# Patient Record
Sex: MALE
[De-identification: names, ages, dates, MRNs, and addresses within clinical notes are randomized per-mention and may not be internally consistent; named-entity substitution may affect disease eponyms.]

---

## 2019-12-19 ENCOUNTER — HOSPITAL ENCOUNTER (OUTPATIENT)
Dept: HOSPITAL 92 - ULT | Age: 69
Discharge: HOME | End: 2019-12-19
Attending: STUDENT IN AN ORGANIZED HEALTH CARE EDUCATION/TRAINING PROGRAM
Payer: MEDICARE

## 2019-12-19 DIAGNOSIS — N44.2: ICD-10-CM

## 2019-12-19 DIAGNOSIS — N43.3: Primary | ICD-10-CM

## 2019-12-19 DIAGNOSIS — K40.90: ICD-10-CM

## 2019-12-19 DIAGNOSIS — N50.3: ICD-10-CM

## 2019-12-19 PROCEDURE — 93976 VASCULAR STUDY: CPT

## 2019-12-19 PROCEDURE — 76870 US EXAM SCROTUM: CPT

## 2019-12-19 NOTE — ULT
SCROTAL ULTRASOUND



INDICATION: Right-sided swelling



TECHNIQUE: Grayscale, color Doppler spectral Doppler images were obtained of the scrotum.



COMPARISON: None.



FINDINGS:



Right Testicle:

 

Size:  3.6 x 3.2 x 2.1cm. There is herniated bowel seen within the right hemiscrotum.

Flow: There is normal vascular flow to the right testicle 

Hydrocele: No right-sided hydrocele is evident

Epididymis:  There is a tiny right epididymal head cyst measuring 4 mm.



Left Testicle: 



Size:  4.4 x 3.1 x 2.2 cm. There is a tiny 4 mm cyst seen within the posterior and inferior left test
icle likely reflective of a benign intratesticular cyst. This is seen near the rete testis.

Flow: There is normal vascular flow to left testicle.  

Hydrocele: Small left-sided hydrocele.

Epididymis:  The left epididymis appears within normal limits.





Additional findings: None.



Impression:

1. Findings most consistent with a right-sided inguinal hernia containing bowel. This is protruding i
nto the right hemiscrotum. CT evaluation of the abdomen and pelvis is recommended for additional

characterization.

2. Small left-sided intratesticular cyst.

3. Small right epididymal head cyst.



Reported By: Paco Knox 

Electronically Signed:  12/19/2019 3:41 PM

## 2020-02-25 NOTE — HP
HISTORY OF PRESENT ILLNESS:  Danilo Santoro is a 69-year-old black male patient,

retired teacher, smokes a pack a day.  Has a large right inguinal hernia into

scrotum.  Plan is robotic mesh repair outpatient.  He does have some decreased force

of stream, but no nocturia.  He has a diagnosis of BPH, but is not on Flomax. 



SOCIAL HISTORY:  Tobacco, one pack per day.  Alcohol occasional, 1-3 drinks of

Meir's. 



ALLERGIES:  NONE.



PAST SURGICAL HISTORY:  Foot surgery.



PAST MEDICAL HISTORY:  Hypertension, BPH.



MEDICATIONS:  

1. Metoprolol 50 mg extended release daily.

2. Losartan potassium 50 mg daily.



REVIEW OF SYSTEMS:  Ten-point noncontributory.



FAMILY HISTORY:  Colon cancer.  Never has had a colonoscopy, I encouraged him to

seek that. 



PHYSICAL EXAMINATION:

VITAL SIGNS:  Weight 206 pounds, height 6 feet 1 inches, blood pressure 153/95,

pulse 91, temperature 97.3 degrees. 

HEAD, EARS, EYES, NOSE AND THROAT:  Unremarkable. 

LUNGS:  Clear to auscultation. 

CARDIAC:  Regular rate and rhythm without murmur or gallop. 

ABDOMEN:  Soft and nontender. 

EXTREMITIES:  Without ankle edema. 

NEUROLOGICAL:  Intact. 

LYMPHATICS:  No lymphadenopathy in neck, axilla, or groins.  Left groin without

hernia.  Testicles normal.  Right inguinal hernia on standing into his scrotum. 



ASSESSMENT AND PLAN:  Right inguinal hernia into scrotum sliding.  We will plan

laparoscopic mesh repair as outpatient.  He understands risks and benefits,

consents. 







Job ID:  [id number]

## 2020-02-25 NOTE — EKG
Test Reason : 

Blood Pressure : ***/*** mmHG

Vent. Rate : 057 BPM     Atrial Rate : 057 BPM

   P-R Int : 134 ms          QRS Dur : 080 ms

    QT Int : 396 ms       P-R-T Axes : 055 049 063 degrees

   QTc Int : 385 ms

 

Sinus bradycardia with marked sinus arrhythmia

Otherwise normal ECG

No previous ECGs available

Confirmed by DR. LAKEISHA LEON (3) on 2/25/2020 5:07:46 PM

 

Referred By:  JOEY           Confirmed By:DR. LAKEISHA LEON

## 2020-02-26 NOTE — OP
DATE OF PROCEDURE:  02/26/2020



PREOPERATIVE DIAGNOSIS:  Sliding right inguinal hernia.



POSTOPERATIVE DIAGNOSIS:  Sliding right inguinal hernia.



PROCEDURE PERFORMED:  Robot/laparoscopic 3D Bard mesh large right repair of right

inguinal hernia. 



ANESTHESIA:  General, local with 0.5% Marcaine with epinephrine 30 mL mixed with 1%

Xylocaine with epinephrine 20 mL. 



DESCRIPTION OF PROCEDURE:  The patient was taken to the operating room where under

general anesthesia Altamirano catheter was placed at the beginning of the procedure and

removed at the end.  Abdomen was prepared with ChloraPrep and draped in routine

fashion.  Local anesthetic was infiltrated in the skin and subcutaneous tissue about

each port site.  Supraumbilical incision was made left to midline and

pneumoperitoneum to 15 mmHg was obtained with a Veress needle, replacing with a 12

balloon port.  Bilateral anterior axial line incision was made at the same level

above the umbilical level and an 8-mm port was placed under laparoscopic

visualization.  Left groin without hernia.  There was a large right inguinal hernia.

 Peritoneal flap dissected free from medial to lateral to the anterior superior

iliac spine and peritoneal flap dissected free to the retroperitoneum.  Cord

structures dissected from the hernia sac, identifying Serafin's ligament.  Once the

hernia sac was dissected free, continued into the scrotum and was transected and

reduced and the retroperitoneum dissected free and the 3D Bard Max mesh large right

placed in anatomically good position and held in place, secured the Serafin's

ligament with 2-0 Vicryl to the anterior abdominal wall to the patient's right of

the inferior epigastric with 2-0 Vicryl and then the peritoneal defect closed with

continuous suture of 3-0 Stratafix.  There was a peritoneal hernia defect. 

Peritoneal defect closed with 2-0 Vicryl suture.  Once this was completed,

pneumoperitoneum reduced.  All instruments were removed and anterior rectus fascia

in the supraumbilical incision closed with 0 Vicryl UR needle and skin incision was

closed with continuous subcuticular suture of 4-0 Monocryl and Derma glue applied.

The patient tolerated the procedure well. 





Job ID:  [id number]

## 2020-12-01 NOTE — CT
CT ABDOMEN AND PELVIS WITH IV CONTRAST

 12/1/2020



CLINICAL INFORMATION:

Midline abdominal pain.



COMPARISON:

 None.



Technique:

Multiple contiguous axial CT images are obtained through the abdomen and pelvis with IV contrast. Cor
onal reformatted images are provided.



FINDINGS:



Lower Chest: Calcified granulomata are seen in the lingula. There are linear bibasilar densities with
 slightly greater groundglass density and increased interstitial opacities in the inferior aspect

left lower lobe and involving the lingula which may be related to chronic interstitial lung changes.

Vessels: Vascular calcifications are seen in the abdominal aorta and involving the iliac arteries. 



Abdomen:

Portal vein:Patent

Gallbladder: Within normal limits for CT imaging.

Liver: Multiple calcified granulomata.

Spleen: Multiple calcified granulomata.

Pancreas: within normal limits.

Adrenals: within normal limits.

Kidneys: There is artifact seen extending through the kidneys bilaterally. There is a low-attenuation
 cystic structure within the midportion right kidney measuring 2.7 cm. This likely represents a

parapelvic renal cyst. Fluid attenuation is present in this cystic structure, but there is artifact e
xtending through this region which limits adequate evaluation. Follow-up renal sonogram is

recommended. Kidneys otherwise have a normal CT appearance.



Bowel: Evidence of colonic diverticulosis with innumerable colonic diverticuli seen involving the yohan
cending and sigmoid colon. Loops of small bowel are normal in caliber.

Appendix: The appendix is visualized and normal in caliber.





Peritoneum: No ascites or free air; no fluid collection.

Mesentery and Retroperitoneum: No enlarged mesenteric or retroperitoneal lymph nodes.

Abdominal Wall: Low-attenuation area is seen within the right inguinal canal and the right anterolate
ral aspect of the pelvis adjacent to the inguinal canal. This may potentially be related to prior

hernia repair in this region. Clinical correlation is recommended.



Pelvis:

Reproductive Organs: No pelvic masses.

Bladder: within normal limits.



Bones: Severe bilateral hip osteoarthritis is present. Degenerative changes seen in the spine with sa
croiliac joint osteoarthritis seen. There is a well-defined lucency with the sclerotic margins seen

in the intertrochanteric region left hip measuring 1.6 cm which has the appearance of a lesion of low
 biological activity.  



IMPRESSION:



1. Low-attenuation cystic structure right kidney which does not demonstrate fluid attenuation to defi
nitively suggest a simple parapelvic cyst. However, there is artifact extending through this region

limiting evaluation. Follow-up nonemergent renal ultrasound is recommended.

2. Colonic diverticulosis.

3. Findings likely attributable to prior right inguinal hernia repair. Clinical correlation is recomm
ended.

4. Severe bilateral hip osteoarthritis.

5. Findings likely attributable to chronic bibasilar lung changes. Superimposed more acute interstiti
al process would be difficult to entirely exclude.

6. No acute findings in the abdomen or pelvis.



Reported By: Ernst Henson 

Electronically Signed:  12/1/2020 3:53 PM

## 2020-12-01 NOTE — PDOC.FPRHP
- History of Present Illness


Chief Complaint: fatigue


History of Present Illness: 





Pt is a 71 yo M with a PMH of HTN, COPD, diverticulosis, and bilateral hip OA 

who presents via EMS for chief complaint of fatigue. Patient states he was 

feeling "fine", maybe a little bit tired, but his wife noticed that over the 

last 4 days patient seemed more fatigued and did not get up from his chair. 

Patient states he still has a normal appetite, but he does not eat much at his 

baseline. Patient only endorses a new cough that started over the last 5 days or

so with some occasional sputum production. He does not require O2 at baseline 

and this cough is new for him. He states his PCP back in [place] prescribed him a

daily inhaler (unknown name) but he has not taken it. Patient denies dysuria, 

fever, chills, N/V, changes in urine, abdominal pain, weakness, CP, SOB.  


ED Course: 





Azitrhomycin, Rocephin, Vancomycin, 2.5L fluids





- Allergies/Adverse Reactions


                                    Allergies











Allergy/AdvReac Type Severity Reaction Status Date / Time


 


No Known Drug Allergies Allergy   Verified 12/01/20 19:35














- Home Medications


                                        











 Medication  Instructions  Recorded  Confirmed  Type


 


Aspirin [Eren Chewable] 81 mg PO DAILY 12/01/20 12/01/20 History


 


Losartan Potassium 50 mg PO DAILY 12/01/20 12/01/20 History














- History


PMHx:


 HTN, diverticulosis, COPD, bilateral hip OA





PSHx: 


foot surgery, hernia repair





FHx:


 HTN





Social:


 2ppd for 20 years, social alcohol use (whiskey and beer on weekends, unable to 

quantify), denies drug use








- Review of Systems


General: reports: fatigue.  denies: fever/chills, weight/appetite/sleep changes


ENT: denies: nasal congestion


Respiratory: reports: cough.  denies: shortness of breath


Cardiovascular: denies: chest pain, edema


Gastrointestinal: denies: nausea, vomiting


Genitourinary: reports: incontinence.  denies: dysuria


Skin: denies: rashes


Musculoskeletal: reports: stiffness


Neurological: denies: syncope





- Vital signs


BP: 125/85  HR: 110 RR: 37 Tmax: 99.9 Pox: 97% on 3L Wt: 96kg  








- Physical Exam


Constitutional: NAD, awake, alert and oriented


HEENT: normocephalic and atraumatic, EOMI, conjunctiva clear, grossly normal 

hearing


Neck: supple, trachea midline


Heart: RRR, normal S1/S2, no murmurs/rubs/gallops, pulses present, no edema


Lungs: no respiratory distress, no wheezing


-Lungs: 





distant breath sounds


Abdomen: soft, non-tender, bowel sounds present, no masses/distention


Musculoskeletal: normal structure, normal tone


Neurological: no focal deficit


Skin: no rash/lesions, good turgor


Psychiatric: normal mood and affect, good judgment and insight, intact recent 

and remote memory





FMR H&P: Results





- Labs


Result Diagrams: 


                                 12/02/20 06:35





                                 12/02/20 06:35


Lab results: 


                                        











WBC  3.0 thou/uL (4.8-10.8)  L  12/01/20  14:07    


 


Hgb  15.9 g/dL (14.0-18.0)   12/01/20  14:07    


 


Hct  47.2 % (42.0-52.0)   12/01/20  14:07    


 


MCV  88.5 fL (78.0-98.0)   12/01/20  14:07    


 


Plt Count  161 thou/uL (130-400)   12/01/20  14:07    


 


Band Neuts % (Manual)  10 % (5-11)   12/01/20  14:07    


 


Sodium  134 mmol/L (136-145)  L  12/01/20  14:07    


 


Potassium  4.4 mmol/L (3.5-5.1)   12/01/20  14:07    


 


Chloride  99 mmol/L ()   12/01/20  14:07    


 


Carbon Dioxide  25 mmol/L (23-31)   12/01/20  14:07    


 


BUN  12 mg/dL (8.4-25.7)   12/01/20  14:07    


 


Creatinine  1.01 mg/dL (0.7-1.3)   12/01/20  14:07    


 


Glucose  105 mg/dL ()   12/01/20  14:07    


 


Lactic Acid  1.0 mmol/L (0.5-2.2)   12/01/20  17:14    


 


Calcium  8.6 mg/dL (7.8-10.44)   12/01/20  14:07    


 


Total Bilirubin  0.6 mg/dL (0.2-1.2)   12/01/20  14:07    


 


AST  31 U/L (5-34)   12/01/20  14:07    


 


ALT  16 U/L (8-55)   12/01/20  14:07    


 


Alkaline Phosphatase  66 U/L ()   12/01/20  14:07    


 


Creatine Kinase  184 U/L ()   12/01/20  14:07    


 


B-Natriuretic Peptide  10.1 pg/mL (0-100)   12/01/20  14:07    


 


Serum Total Protein  7.3 g/dL (5.8-8.1)   12/01/20  14:07    


 


Albumin  3.6 g/dL (3.4-4.8)   12/01/20  14:07    


 


Urine Ketones  Negative mg/dL (Negative)   12/01/20  13:42    


 


Urine Blood  Negative  (Negative)   12/01/20  13:42    


 


Urine Nitrite  2+  (Negative)  A  12/01/20  13:42    


 


Ur Leukocyte Esterase  250 Ghassan/uL (Negative)  A  12/01/20  13:42    


 


Urine RBC  0-3 HPF (0-3)   12/01/20  13:42    


 


Urine WBC  21-50 HPF (0-3)  A  12/01/20  13:42    


 


Ur Squamous Epith Cells  None Seen HPF (0-3)   12/01/20  13:42    


 


Urine Bacteria  4+ HPF (None Seen)  A  12/01/20  13:42    














FMR H&P: A/P





- Plan





Sepsis likely 2/2 UTI vs influenza


-WBC 3.0, tachypneic, tachycardic, febrile as per EMS report


-UA suggestive of UTI, follow up culture


-lactic acid 2.1>1.0


-s/p Azitrho, rocephin, Vanc in ED


-Rocephin while culture and sensitivities are pending


-Flu swab pending, procal pending





Hypoxia likely 2/2 COPD exacerbation vs chronic untreated COPD 


-DuoNebs q46 CASSANDRA, q6PRN


-prednisone 40mg for 5 days





Bilateral hip OA


-aware


-PRN Tylenol





HTN


-aware, continue home meds


-avoid beta blockers





BPH


-aware, continue home meds





Right kidney cyst


-likely simple parapelvic cyst found incidentally on CT abd/pelvis


-recommended non emergent renal U/s, pending. follow up results





Diverticulosis


-found incidentally on Ct Abd/Pelvis


-encourage increased fiber intake





Increased alcohol consumption


-ASE protocol ordered





Fluid: KVO


DVT Ppx: Lovenox


Diet: HH


PCP: [facility] call- none


Dispo: admit to medical, inpatient. Anticipated LOS > 48 hours








FMR H&P: Upper Level





- Plan


Date/Time: 12/01/20 1900








I, Donnie Bueno [id number], have evaluated this patient and agree with findings/plan as

outlined by intern resident. Pertinent changes/additions are listed here.





69yo M presenting with non specific symptoms and cough





Labs show evidence of UTI and pt meeting SIRS criteria on exam by fever with EMS

and tachycardia in house. Otherwise exam is unremarkable





Will workup/treat for:





Sepsis 2/2 UTI vs. Influenza- stable and fluid resuscitated. continue rocephin. 

Will f/u BCx and UCx, f/u influenza swab and will do rectal exam to r/o 

prostatitis


hypoxia 2/2 COPD exacerbation- prednisone and duonebs, rocephin for increased 

sputum production.


Renal cysts- incidental finding, will get US


EtOH consumption- ASE protocol


HTN, BPH- quiescent, continue home meds








Addendum - Attending





- Attending Attestation


Date/Time: 12/02/20 2055





I personally evaluated the patient and discussed the management with Denise Bueno and Bishnu. 


I agree with the History, Examination, Assessment and Plan documented above with

any addition or exceptions noted below.

## 2020-12-01 NOTE — CT
CT BRAIN WITHOUT CONTRAST:



HISTORY: Altered mental status



FINDINGS:

No evidence of acute infarct, hemorrhage, midline shift or abnormal extra-axial fluid collections is 
seen. 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 The ventricular size is

appropriate and the basilar cisterns are patent. The bony calvarium is intact. The visualized paranas
al sinuses and mastoid air cells are well aerated.



IMPRESSION: No CT evidence of acute intracranial process.



Reported By: Mulugeta Isaac 

Electronically Signed:  12/1/2020 3:37 PM

## 2020-12-01 NOTE — CT
CT PULMONARY ANGIOGRAM WITH IV CONTRAST AND 3D POSTPROCESSIN20

 

HISTORY: 

Elevated D-dimer. Altered mental status. 

 

Decreased mobility. 

 

FINDINGS: 

There is good contrast opacification of the pulmonary arterial vasculature without filling defects to
 suggest pulmonary embolism. No evidence of thoracic aortic aneurysm or dissection is seen. No pleura
l or pericardial effusions are identified. 

 

There are emphysematous changes with upper zone dominance bilaterally. Peripheral calcified nodules a
re seen in the left upper lobe consistent with old granulomatous disease. No lobar consolidation or l
marbin masses are seen. 

 

There are degenerative changes in the spine. There are calcified granulomas in the liver and spleen a
nd cholelithiasis in the gallbladder. 

 

IMPRESSION: 

No CT evidence of pulmonary embolism. 

 

POS: OFF

## 2020-12-01 NOTE — RAD
XR Chest 1 View Portable



HISTORY: COPD fever hypertension



COMPARISON: None



FINDINGS: The heart size normal. There are changes of emphysema and old granulomatous disease. No foc
al areas of consolidation, pneumothoraces or pleural effusions are seen.



IMPRESSION: No radiographic evidence of acute cardiopulmonary process.



Reported By: Mulugeta Isaac 

Electronically Signed:  12/1/2020 2:02 PM

## 2020-12-02 NOTE — ULT
BILATERAL RENAL ULTRASOUND:

 

Date:  12/01/2020

 

COMPARISON:  

CT abdomen/pelvis 12/01/2020. 

 

HISTORY:  

Possible parapelvic cyst seen on CT. 

 

TECHNIQUE:  

Multiplanar Gray scale and color Doppler images were obtained in a renal ultrasound. 

 

FINDINGS:

There is a right parapelvic cyst measuring 2.2 cm in greatest dimension. This is anechoic and corresp
onds to the abnormality on CT. No solid mass is seen in either kidney. No hydronephrosis or calculi a
re seen. The kidneys measure 11.0 and 11.3 cm in length on the right and left, respectively. 

 

Limited visualization of the urinary bladder is unremarkable. 

 

IMPRESSION: 

Right renal cyst. 

 

 

POS: EAA

## 2020-12-02 NOTE — PDOC.FM
- Subjective


Subjective: 


Patient resting comfortably in bed, complaining of being awoken because he "just

fell asleep." Otherwise has no complaints. 





- Objective


Vital Signs & Weight: 


                             Vital Signs (12 hours)











  Temp Pulse Resp BP Pulse Ox


 


 12/02/20 03:31  98.6 F  95  18  114/71  97


 


 12/02/20 02:53      100


 


 12/01/20 23:56  98.6 F  90  18  128/79  100


 


 12/01/20 23:55      93 L


 


 12/01/20 20:04      95


 


 12/01/20 20:00      100


 


 12/01/20 19:30  99.1 F  77  18  127/86  100








                                     Weight











Weight                         95.85 kg














I&O: 


                                        











 11/30/20 12/01/20 12/02/20





 06:59 06:59 06:59


 


Intake Total   360


 


Output Total   500


 


Balance   -140











Result Diagrams: 


                                 12/02/20 06:35





                                 12/02/20 06:35


Radiology Reviewed by me: Yes (Renal US: right renal cyst)





Phys Exam





- Physical Examination


Constitutional: NAD


HEENT: moist MMs, sclera anicteric


Neck: full ROM


Respiratory: no wheezing, clear to auscultation bilateral


Cardiovascular: RRR, no significant murmur


Gastrointestinal: soft, non-tender


Musculoskeletal: no edema, pulses present


Neurological: moves all 4 limbs


Psychiatric: A&O x 3


Skin: no rash





Dx/Plan





- Plan


Plan: 


Sepsis likely 2/2 UTI vs influenza


-Tachypneic, tachycardic, febrile as per EMS report


-WBC 3 > 2


-UA suggestive of UTI, follow up culture


-lactic acid 2.1>1.0


-s/p Azitrho, rocephin, Vanc in ED


-Rocephin while culture and sensitivities are pending


-Flu negative, Procal 0.06





Hypoxia likely 2/2 COPD exacerbation vs chronic untreated COPD 


-DuoNebs q46 CASSANDRA, q6PRN


-prednisone 40mg for 5 days





Bilateral hip OA


-aware


-PRN Tylenol





HTN


-aware, continue home meds


-avoid beta blockers





BPH


-aware, continue home meds





Right kidney cyst


-likely simple parapelvic cyst found incidentally on CT abd/pelvis


-recommended non emergent renal U/s, pending. follow up results





Diverticulosis


-found incidentally on Ct Abd/Pelvis


-encourage increased fiber intake





Increased alcohol consumption


-ASE protocol ordered





Fluid: KVO


DVT Ppx: Lovenox


Diet: HH


PCP: [facility] call- none


Dispo: Continue abx treatment for UTI. Await culture results for outpatient 

antibiotic choice.





Addendum - Attending





- Attending Attestation


Date/Time: 12/02/20 1430





I personally evaluated the patient and discussed the management with Dr. Morales.


I agree with the History, Examination, Assessment and Plan documented above with

any addition or exceptions noted below.





Sepsis: improved


Leukopenia: stable





Continue antibiotics, await cultures. Leukopenia may be due to sepsis but will 

send HIV as a see no test in our system.

## 2020-12-03 NOTE — PDOC.FM
- Subjective


Subjective: 


Patient is lying comfortably in bed. Says he is tired and his muscles feel sore.

Patient refused to roll over or sit up to listen to his lungs saying, "I just 

don't feel like it. I just want to lie here."





- Objective


Vital Signs & Weight: 


                             Vital Signs (12 hours)











  Temp Pulse Resp BP BP Pulse Ox


 


 12/02/20 20:00       97


 


 12/02/20 19:59     108/61  


 


 12/02/20 19:32  98.8 F  78  18   108/61  97








                                     Weight











Weight                         95.85 kg














I&O: 


                                        











 12/01/20 12/02/20 12/03/20





 06:59 06:59 06:59


 


Intake Total  360 760


 


Output Total  500 


 


Balance  -140 760











Result Diagrams: 


                                 12/03/20 07:52





                                 12/03/20 07:52





Phys Exam





- Physical Examination


Constitutional: NAD


HEENT: moist MMs, sclera anicteric


Neck: full ROM


Respiratory: no wheezing, no rales, no rhonchi, clear to auscultation bilateral


Cardiovascular: RRR, no significant murmur


Gastrointestinal: soft, non-tender


Musculoskeletal: no edema, pulses present


Neurological: non-focal, moves all 4 limbs


Psychiatric: normal affect, A&O x 3


Skin: no rash





Dx/Plan





- Plan


Plan: 


Sepsis likely 2/2 UTI vs influenza


-Tachypneic, tachycardic, febrile as per EMS report


-WBC 3 > 2 > 5.3


-UA suggestive of UTI


-UCx; Ecoli


-lactic acid 2.1>1.0


-Flu negative, Procal 0.06


-s/p Azitrho, rocephin, Vanc in ED


-Rocephin started 12/1


-Likey discharge home today on oral antibiotics





Hypoxia likely 2/2 COPD exacerbation vs chronic untreated COPD 


-DuoNebs q46 CASSANDRA, q6PRN


-prednisone day 3/5





Leukopenia


- 3.0, 2.0, 5.3


- sepsis vs alcoholism


- HIV pending





Bilateral hip OA


-aware


-PRN Tylenol





HTN


-aware, continue home meds


-avoid beta blockers





BPH


-aware, continue home meds





Right kidney cyst


-likely simple parapelvic cyst found incidentally on CT abd/pelvis


-recommended non emergent renal U/s, pending. follow up results





Diverticulosis


-found incidentally on Ct Abd/Pelvis


-encourage increased fiber intake





Increased alcohol consumption


-ASE protocol ordered





Fluid: KVO


DVT Ppx: Lovenox


Diet: HH


PCP: [facility] call- none


Dispo: Likely discharge home today on oral antibiotics





Addendum - Attending





- Attending Attestation


Date/Time: 12/03/20 1041





I personally evaluated the patient and discussed the management with Dr. Morales.


I agree with the History, Examination, Assessment and Plan documented above with

any addition or exceptions noted below.





Doing very well, no cp/sob/n/v/f/c. No CVAT and improved. Plan to transition to 

PO antibiotics and dc.

## 2020-12-04 NOTE — DIS
DATE OF ADMISSION:  12/01/2020



DATE OF DISCHARGE:  12/03/2020



RESIDENT:  Keven Morales MD



ADMITTING ATTENDING:  Gordon Bustamante MD



DISCHARGE ATTENDING:  Jaylon Toledo MD



CONSULT:  PT.



PROCEDURES:  

1. Chest x-ray showed no acute cardiopulmonary process.

2. Brain CT showed no evidence of acute process.

3. Abdomen and pelvis CT shows cystic structure on the right kidney suggesting a

simple parapelvic cyst, colonic diverticulitis, right inguinal hernia repair, severe

bilateral hip osteoarthritis, and chronic bibasilar lung changes.  No acute

findings. 

4. Chest CTA showed no evidence of a pulmonary embolism.

5. Renal ultrasound shows a right renal cyst.



PRIMARY DIAGNOSES:  Sepsis secondary to urinary tract infection and hypoxia

secondary to chronic obstructive pulmonary disease. 



SECONDARY DIAGNOSES:  Leukopenia, bilateral hip osteoarthritis, hypertension, benign

prostatic hyperplasia, right kidney cyst, diverticulosis, and increased alcohol. 



DISCHARGE MEDICATIONS:  

1. Losartan 50 mg p.o. daily.

2. Aspirin 81 mg p.o. daily.

3. Prednisone 40 mg p.o. daily for three days.

4. Bactrim 1 tablet p.o. b.i.d. for eight days.



DISCONTINUED MEDICATIONS:  None.



HISTORY OF PRESENT ILLNESS/HOSPITAL COURSE:  Mr. Santoro is a 70-year-old male with a

past medical history of hypertension, COPD, diverticulosis, and bilateral hip OA,

who presented via EMS for chief complaint of fatigue.  The patient states he was

feeling fine, but a little tired, but his wife notes that over the last four days,

he seemed more fatigued, did not get up from his chair.  The patient reports a

normal appetite, but is not eating much at baseline.  He endorses a new cough that

started over the last five days with some occasional sputum production.  He does not

require O2 at baseline and this cough is new for him.  The patient's PCP in [place]

prescribed him a new inhaler, but he does not take it.  The patient denies dysuria,

fever, chills, nausea, vomiting, changes in urine, abdominal pain, weakness,

shortness of breath, or chest pain.  The patient received fluids, azithromycin,

Rocephin, and vancomycin in the ED.  Urinalysis revealed a UTI.  The patient had a

white count of 3, was tachypneic, tachycardic, and febrile per EMS before

presentation.  Lactic acid was 2.1.  The patient was diagnosed with sepsis secondary

to a UTI.  He was continued on Rocephin for culture and sensitivities resulted.  The

patient was also mildly hypoxic, likely due to chronic untreated COPD.  He received

DuoNeb during his hospitalization and was started on a five-day course of

prednisone.  Urine culture revealed E. coli.  The patient was discharged home on an

additional seven days of Bactrim twice a day as he had already received three days

of Rocephin in the hospital.  The patient was encouraged to fill the inhaler that

his primary care doctor prescribed him for his COPD. 



DISPOSITION:  Stable.



DISCHARGE INSTRUCTIONS:  

1. Location:  Home.

2. Diet:  Regular.

3. Activity:  As tolerated.

4. Follow up with PCP in 1 to 2 weeks.







Job ID:  [id number]

## 2020-12-13 NOTE — PDOC.FPRHP
- History of Present Illness


Chief Complaint: urinary incontinence


History of Present Illness: 





69 y/o M with PMHx HTN, COPD, bilateral hip OA brought by EMS for urinary 

symptoms. Per report, EMS was called by patient's wife after he was noted to be 

sitting in his armchair at home after urinating in the chair. Recently admitted 

for similar symptoms and at that time was treated for sepsis 2/2 UTI and COPD 

with chronically uncontrolled symptoms. Today, patient states that he feels like

his usual self, cannot remember why his wife called EMS. He denies any dysuria, 

hematuria, urinary frequency. He is a questionable historian. Unable to reach 

wife for further history. Recalls sitting in the chair, cannot recall how or why

he urinated in the chair. Reports normally able to ambulate well with cane, 

today he is unable to move himself around in the bed without assistance. Denies 

headache, chest pain, SOB, cough (although noted to have occasional dry cough on

exam), constipation (reports last BM yesterday).


ED Course: 


s/p rocephin, azithromycin, 30 cc/kg NS


CXR: hyperinflation, emphysematous changes, no consolidation





- Allergies/Adverse Reactions


                                    Allergies











Allergy/AdvReac Type Severity Reaction Status Date / Time


 


No Known Drug Allergies Allergy   Verified 12/01/20 19:35














- Home Medications


                                        











 Medication  Instructions  Recorded  Confirmed  Type


 


Aspirin [Eren Chewable Aspirin] 81 mg PO DAILY 12/01/20 12/01/20 History


 


Losartan Potassium 50 mg PO DAILY 12/01/20 12/01/20 History


 


Sulfamethoxazole/Trimethoprim 1 tab PO BID 8 Days #15 tab 12/03/20  Rx





[Bactrim DS]    


 


predniSONE 40 mg PO DAILY 3 Days #3 tab 12/03/20  Rx














- History


Past histories obtained via chart review: 


PMHx: HTN, diverticulosis, COPD, bilateral hip OA, BPH





PSHx: foot surgery, hernia repair





FHx: HTN





Social: 2ppd for 20 years, social alcohol use (whiskey and beer on weekends, 

unable to quantify), denies drug use 








- Review of Systems


General: denies: fever/chills, weight/appetite/sleep changes


Eyes: denies: vision changes


ENT: denies: nasal congestion, rhinorrhea


Respiratory: reports: cough.  denies: congestion, shortness of breath


Cardiovascular: denies: chest pain, palpitation, edema


Gastrointestinal: denies: nausea, vomiting, diarrhea, constipation, abdominal 

pain


Genitourinary: reports: incontinence.  denies: dysuria


Musculoskeletal: reports: pain, arthritis/arthralgias


Neurological: denies: syncope, weakness





- Vital signs


BP: 138/86  HR: 110 RR: 30 Tmax: 98.5F Pox: 90 on RA  Wt: 79 kg   








- Physical Exam


Constitutional: NAD, other (oriented to self, location, not oriented to time)


HEENT: normocephalic and atraumatic, conjunctiva clear, no scleral icterus, 

grossly normal vision, grossly normal hearing


-HEENT: 





dry mucous membranes


Neck: supple, no LAD


Chest: no-tender to palpation


Heart: no murmurs/rubs/gallops, no edema


-Heart: 





tachycardia, regular rhythm, 2+ DP right, 1+ DP left


Lungs: CTAB, no respiratory distress, no rales/rhonchi, no wheezing


Abdomen: non-tender, bowel sounds present


-Abdomen: 





lower abdominal fullness, no rebound, guarding, or rigidity


Musculoskeletal: normal structure


Neurological: no focal deficit, other (global weakness in all 4 extremities 4/5)


Skin: other (pinpoint, dry ulceration of R great toe)


-Skin: 





no skin breakdown over sacrum


Heme/Lymphatic: no unusual bruising or bleeding


-Psychiatric: 


recent memory impaired


Additional comment: 


Rectal exam: prostate non-tender, good rectal tone





FMR H&P: Results





- Labs


Result Diagrams: 


                                 12/13/20 19:28





                                 12/13/20 19:28


Lab results: 


                                        











WBC  10.6 thou/uL (4.8-10.8)   12/13/20  19:28    


 


Hgb  16.2 g/dL (14.0-18.0)   12/13/20  19:28    


 


Hct  49.3 % (42.0-52.0)   12/13/20  19:28    


 


MCV  88.8 fL (78.0-98.0)   12/13/20  19:28    


 


Plt Count  489 thou/uL (130-400)  H  12/13/20  19:28    


 


Neutrophils %  75.4 % (42.0-75.0)  H  12/13/20  19:28    


 


Sodium  142 mmol/L (136-145)   12/13/20  19:28    


 


Potassium  4.8 mmol/L (3.5-5.1)   12/13/20  19:28    


 


Chloride  102 mmol/L ()   12/13/20  19:28    


 


Carbon Dioxide  24 mmol/L (23-31)   12/13/20  19:28    


 


BUN  21 mg/dL (8.4-25.7)   12/13/20  19:28    


 


Creatinine  1.20 mg/dL (0.7-1.3)   12/13/20  19:28    


 


Glucose  114 mg/dL ()  H  12/13/20  19:28    


 


Lactic Acid  2.8 mmol/L (0.5-2.2)  H  12/13/20 19:28    


 


Calcium  9.8 mg/dL (7.8-10.44)   12/13/20 19:28    


 


Total Bilirubin  2.5 mg/dL (0.2-1.2)  H  12/13/20 19:28    


 


AST  18 U/L (5-34)   12/13/20 19:28    


 


ALT  26 U/L (8-55)   12/13/20 19:28    


 


Alkaline Phosphatase  111 U/L ()  H  12/13/20 19:28    


 


Serum Total Protein  9.2 g/dL (5.8-8.1)  H  12/13/20 19:28    


 


Albumin  4.0 g/dL (3.4-4.8)   12/13/20 19:28    


 


Urine Ketones  Negative mg/dL (Negative)   12/13/20 19:25    


 


Urine Blood  Negative  (Negative)   12/13/20 19:25    


 


Urine Nitrite  Negative  (Negative)   12/13/20 19:25    


 


Ur Leukocyte Esterase  Negative Ghassan/uL (Negative)   12/13/20 19:25    


 


Urine RBC  0-3 HPF (0-3)   12/13/20 19:25    


 


Urine WBC  4-6 HPF (0-3)  A  12/13/20 19:25    


 


Ur Squamous Epith Cells  0-3 HPF (0-3)   12/13/20 19:25    


 


Urine Bacteria  None Seen HPF (None Seen)   12/13/20 19:25    














- EKG Interpretation


EKG: 


sinus tachycardia





FMR H&P: A/P





- Plan





SIRS


Tachycardic, tachypneic without obvious source of infection. Lactate 2.8 on 

admission. UA with 4-6 WBC, no bacteria. Recently treated for UTI with rocephin,

bactrim; unclear if patient finished course of bactrim. CXR without infiltrate. 

s/p rocephin, azithro, 30 cc/kg bolus in ED.


- f/u urine cx, blood cx


- will continue rocephin pending cx results


- procalcitonin 0.15, low risk for sepsis





Urinary incontinence


DDx: UTI, urinary retention with overflow incontinence, difficulty ambulating to

bathroom. Prostate non-tender on examination. 


- bladder scan with PVR


- f/u urine cx


- consider flomax if 2/2 BPH causing urinary retention/overflow incontinence





COPD


Suspect intermittent cough, tachypnea may be due to chronically poor control of 

COPD. 


- will check influenza, COVID; negative for both within the past 2 weeks


- start dulera


- scheduled duonebs





KEMAR


Cr 1.2 on admission, baseline around 0.8. Dry mucous membranes, suspect poor PO 

intake.


- s/p approx 2.5 L NS bolus in ED


- LR @ 120 cc/hr


- recheck Cr in AM





BPH, suspected


History of BPH documented in previous admission. Does not appear to have any 

home medications for this.


- consider flomax if urine cx negative





HTN


Mildly elevated on admission. 


- continue home losartan


- continue to monitor vitals





Osteoarthritis


Generalized weakness


Unable to move himself in the bed. Cites pain particularly in bilateral hips 

where he is known to have osteoarthritis.


- PT/OT consulted


- Will likely need placement on discharge, CM/Post acute screen requested.





Dispo: inpatient medical, expected LOS > 2 midnights.


DVT ppx: lovenox


Diet: HH


IVF:  cc/hr


Code: Full








FMR H&P: Upper Level





- Plan


Date/Time: 12/13/20 2053








ISera, have evaluated this patient and agree with findings/plan as 

outlined by intern resident. Pertinent changes/additions are listed here.





HPI:


71 yo M with PMH of COPD, HTN, BPH, recently admitted for UTI and 

deconditioning, presents to the ER for weakness and incontinence. Per report 

from ER, patient has been sitting in his chair for the past 3 days, wife called 

EMS, and his chair was soaked in urine. Patient is a poor historian, and reports

his wife made him come but does not know why. He reports pain in his hips, knee

s, and feet. Wife is not present to give further history. Attempts were made to 

contact her by phone for further history, but there was no answer.





Objective:


In the ER, he is AOx2 with uncertain baseline. Tachycardic to 110s, satting 90% 

on 3L, with respirations in the 30s. He was dehydrated on exam and lab work was 

also suggestive of dehydration. Normal WBC, CXR, and EKG remarkable for LVH and 

sinus tachycardia. He was given 30 ml/kg NS bolus. He was also given 500 mg 

azithromycin and Rocephin 2 g IV for possible UTI due to leukocytes on UA. 





On exam, lung sounds bilaterally clear to auscultation, heart is tachycardic 

with no murmurs. Patient has diffuse weakness, and is unable to lift his legs 

off of the bed, sit up, or roll onto his sides without help. No skin lesions 

seen, no ulceration over sacrum. He is AO to self, place, and can name the 

president and president-elect, but does not know the year. Prostate is 

nontender. 





A/P:


SIRS, unknown source


-Tachypneic in 20s, Pulse up to 110s. WBC wnl, UA showed leukocytes. Hx of 

recent e coli UTI treated with Bactrim. U and blood cx sent, started on 

Rocephin. I do not see a source of infection to warrant MRSA coverage with 

antibiotics at this time. Influenza and Covid ordered. Patient satting 97% on RA

but tachypneic so O2 BNC was started.





Dehydration and KEMAR, likely 2/2 poor intake


-Likely cause of anion gap and lactic acidosis. Rehydrate with IV fluids, 

encourage PO intake, trend lactic acid and recheck BMP in AM.





Urinary incontinence


-Likely overflow incontinence 2/2 BPH (recorded last visit), vs deconditioning 

vs UTI. UA showed only Leukocytes with no bacteria or nitrites. Patient would 

likely benefit from starting Flomax. Pre-void of 600 ml, pt voided about 20 ml. 

Post-void residual bladder scan was 600, post void in and out cath pending.





Severe Deconditioning


-Patient will need placement in either SNF or Rehab to regain strength before 

going home. He may also benefit from admission to a long term care facility.





COPD


-Lung sounds clear on exam today, emphysema on CXR, tachypneic. Patient should 

be started on maintenance COPD medications prior to discharge. Dulera q4h while 

inpatient.





Dispo: Admit, LOS >2 midnights.

## 2020-12-13 NOTE — RAD
Chest one view



HISTORY: Fever.



COMPARISON: 12/1/2020.



FINDINGS: Cardiac silhouette is magnified by projection. Pulmonary vasculature is unremarkable.



Mediastinum is midline with aortic calcification.



Lungs are hyperinflated. Emphysematous bullae at each upper lobe appears stable. Calcified granulomat
a are consistent with healed granulomatous disease.



No lobar consolidation or evidence of pneumothorax.



  



IMPRESSION :

Emphysematous changes and other chronic-type findings are stable. No active cardiopulmonary abnormali
ties are demonstrated.



Reported By: NICOLASA Nuno 

Electronically Signed:  12/13/2020 7:49 PM

## 2020-12-14 NOTE — PRG
DATE OF SERVICE:  12/14/2020



ADDENDUM:  Please add this to the addendum of Dr. Sheela Galvan. 



Mr. Santoro is a 70-year-old black man, who was admitted through the ER after

presenting with urinary incontinence and possible urinary tract infection.

Specifically, he has urinated on himself at home and the wife brought him to the ER

for further evaluation and workup.  He seems somewhat confused and cannot remember

why his wife called EMS.  He was also noted to have a slight elevation of his lactic

acid and he met SIRS criteria.  He was therefore admitted with possible urinary

tract infection versus BPH causing retention and urinary incontinence.  We will

continue to monitor, administer antibiotics, and see if there is any change in his

mental status and urinary symptoms. 







Job ID:  [id number]

## 2020-12-14 NOTE — PDOC.FM
- Subjective


Subjective: 


Mr. Santoro is doing well this morning and only has complaints of wanting to be 

out of the hospital. He is oriented to location, month, and self. He is unsure 

of the year and knows he is in the hospital "because of my wife. Because of 

something having to do with control". The nurse will bladder scan him q6h and 

inform of results while seeing how much urine he is able to void by himself.








- Objective


Vital Signs & Weight: 


                             Vital Signs (12 hours)











  Resp Pulse Ox


 


 12/14/20 05:09   99


 


 12/14/20 05:02  18  99











Result Diagrams: 


                                 12/14/20 04:20





                                 12/14/20 04:20





Phys Exam





- Physical Examination


Constitutional: NAD


Neck: supple


Crackles b/l. Quiet breath sounds likely 2/2 hyperinflation


Cardiovascular: RRR


Difficult to auscultate likely 2/2 hyperinflation


Musculoskeletal: no edema, pulses present (2+ radial )


Neurological: non-focal


Psychiatric: normal affect


Skin: no rash





Dx/Plan





- Plan


Plan: 


SIRS


Tachycardic, tachypneic without obvious source of infection. 


- Lactate 2.8 -> 2.1


- UA with 4-6 WBC, no bacteria. 


* Recently treated for UTI with rocephin, bactrim; unclear if patient finished 

  course of bactrim. 


- CXR without infiltrate


- f/u urine cx, blood cx


* will continue rocephin pending cx results


- procalcitonin 0.15, low risk for sepsis





Urinary incontinence


DDx: UTI, urinary retention with overflow incontinence, difficulty ambulating to

bathroom. 


- Prostate non-tender on examination. 


- bladder scan with PVR volume


* consider placing kearney if volume remains high to avoid repetitive straight 

  cath


- f/u urine cx


- Starting Flomax today at 0.4mg





Altered mentation


Ddx: dementia, delirium, metabolic encephalopathy 2/2 unknown infxn


- Reportedly improved with tx in the ED


- Today, A&O to self, month, location. Moderately aware of reason for being 

here. Unsure of year.


- Will call wife to establish baseline mentation


- Consider SLP consult for mental status exam





COPD


Suspect this is the cause of intermittent cough


- tachypnea may be due to chronically poor control of COPD. 


- influenza neg, COVID pending


* negative for both within the past 2 weeks


- start dulera and scheduled duonebs





BPH, suspected


History of BPH documented in previous admission. Does not appear to have any 

home medications for this.


- consider flomax if urine cx negative





HTN


Mildly elevated on admission. 


- continue home losartan


- continue to monitor vitals





Osteoarthritis


Generalized weakness


Unable to move himself in the bed. Cites pain particularly in bilateral hips 

where he is known to have osteoarthritis.


- Will call wife today to establish baseline mobility


- PT/OT consulted


- Will likely need placement on discharge, CM/Post acute screen requested.





KEMAR, resolved


- Cr 0.89 this am c/w baseline therefore KEMAR likely 2/2 dehydration given dry 

mucosa on admission


- LR @ 120 cc/hr


- monitor with am labs








Dispo: inpatient medical, expected LOS > 2 midnights.


DVT ppx: lovenox


Diet: HH


IVF:  cc/hr


Code: Full

## 2020-12-15 NOTE — PRG
DATE OF SERVICE:  12/15/2020



Mr. Santoro was discovered to have a sedimentation rate of 121 and a CRP of 26.85.

In further history taking, he states that he has a long history of rheumatoid

arthritis, for which he was recently taking prednisone.  We are awaiting some

confirmatory inflammatory markers, but otherwise we will start him back on

prednisone as well as add methotrexate. 







Job ID:  [id number]

## 2020-12-15 NOTE — RAD
LEFT HIP 4 VIEWS:

 

Date:  12/15/2020

 

HISTORY:  

Left leg pain. 

 

FINDINGS:

Moderate degenerative changes at the left hip. The femoral head contour is preserved. However, there 
is joint narrowing, spurring, and subchondral cystic changes on both sides of the hip joint. 

 

No evidence of acute fracture identified. 

 

Degenerative changes at the right hip are partially imaged. 

 

IMPRESSION: 

Moderate degenerative changes left hip as described. No acute fracture identified. 

 

 

POS: AGW

## 2020-12-15 NOTE — RAD
LEFT KNEE:

 

Date:  12/15/2020

 

HISTORY:  

Knee pain. Falls with injury. 

 

FINDINGS:

Large joint effusion is noted in the suprapatellar region. 

 

There are moderate to severe degenerative changes. Loss of the medial joint space with prominent medi
al marginal osteophytes. There is spurring at the patellofemoral joint. 

 

No acute fracture identified. 

 

IMPRESSION: 

Moderate to severe degenerative changes of left knee with large joint effusion. 

 

 

POS: AGW

## 2020-12-15 NOTE — PDOC.FM
- Subjective


Subjective: 


Mr. Santoro is in a grumpy mood this morning and was not very cooperative 

answering questions. He would not tell me where he is, the year, or why he is 

here. Instead, he was adamant that he knows the answer to all of those things 

and stated his wife's version of events is different than his although he would 

not explain his stating "it doesn't matter. That's not the point". He endorses L

LE pain but cannot tell me where exactly the pain occurs or when it started. He 

states the pain occurs mostly with movement - he can wiggle his toes and has 

bounding dp pulses b/l but he would not raise his leg d/t concern it would hurt.

Per his wife, he started having L LE pain/instability "a few months ago".








- Objective


Vital Signs & Weight: 


                             Vital Signs (12 hours)











  Temp Pulse Resp BP Pulse Ox


 


 12/15/20 04:00  99.7 F H  102 H  18  130/78  92 L


 


 12/15/20 03:43      94 L


 


 12/15/20 02:40      96


 


 12/14/20 23:57      96


 


 12/14/20 23:54      96


 


 12/14/20 23:34  98.7 F  102 H  18  136/76  92 L


 


 12/14/20 19:44  98.6 F  100  18  163/89 H  98








                                     Weight











Weight                         88.314 kg














I&O: 


                                        











 12/13/20 12/14/20 12/15/20





 06:59 06:59 06:59


 


Output Total   500


 


Balance   -500











Result Diagrams: 


                                 12/14/20 04:20





                                 12/15/20 06:24





Phys Exam





- Physical Examination


Constitutional: NAD


Neck: supple


Respiratory: clear to auscultation bilateral


Cardiovascular: RRR, no significant murmur


Musculoskeletal: no edema, pulses present (bounding dp b/l)


Neurological: non-focal, moves all 4 limbs


Psychiatric: normal affect


Skin: no rash





Dx/Plan





- Plan


Plan: 


This is a 71yo M who was brought to the ED after his wife called EMS when he had

not gotten out of a chair for 3 days and then had an episode of incontinence.





SIRS


Tachycardic, tachypneic without obvious source of infection. 


- Lactate 2.8 -> 2.1


- UA with 4-6 WBC, no bacteria. 


* Recently treated for UTI with rocephin, bactrim; unclear if patient finished 

  course of bactrim. 


- CXR without infiltrate


- f/u urine cx, blood cx


* will continue rocephin pending cx results


- procalcitonin 0.15, low risk for sepsis





Urinary incontinence


DDx: UTI, urinary retention with overflow incontinence, difficulty ambulating to

bathroom. 


- Prostate non-tender on examination. 


- bladder scan with PVR volume. Page residents to determine need for straight 

cath/kearney. Will likely d/c this today as he has been voiding well.


- f/u urine cx


- Starting Flomax today at 0.4mg


- Urine very dark despite LR @ 120mL/h. Pt encouraged to drink plenty of fluids 

and he is in agreement.





Altered mentation


Ddx: dementia, delirium, metabolic encephalopathy 2/2 unknown infxn


- A&O x4 at baseline


* Baseline mentation as described by wife is described in a brief progress note 

  from 12/14


- UDS neg. RPR, HIV, Hep C non-reactive.


- CRP 26.85. 


- MRI w/wo contrast pending





L LE pain


- Unclear time/cause of onset of "a few months ago" per his wife


- Per wife, has had multiple falls as a result of the pain and instability in 

this leg. Pt denies this.


- Pt unable to identify location of pain





COPD


Suspect this is the cause of intermittent cough


- tachypnea may be due to chronically poor control of COPD. 


- influenza neg, COVID pending


* negative for both within the past 2 weeks


- start dulera and scheduled duonebs





BPH, suspected


History of BPH documented in previous admission. Does not appear to have any 

home medications for this.


- consider flomax if urine cx negative





Direct Hyperbilirubinemia


- AST/ALT nml


- Alk Phos minimally elevated, will repeat CMP this am


- TBili 2.0, direct bili 1.0





HTN


Mildly elevated on admission. 


- continue home losartan


- continue to monitor vitals





Osteoarthritis


Generalized weakness


Unable to move himself in the bed. Cites pain particularly in bilateral hips 

where he is known to have osteoarthritis.


- Will call wife today to establish baseline mobility


- PT/OT consulted


- Will likely need placement on discharge, CM/Post acute screen requested.





KEMAR, resolved


- Cr 0.89 this am c/w baseline therefore KEMAR likely 2/2 dehydration given dry 

mucosa on admission


- LR @ 120 cc/hr


- monitor with am labs








Dispo: inpatient medical, expected LOS > 2 midnights.


DVT ppx: lovenox


Diet: HH


IVF:  cc/hr


Code: Full

## 2020-12-15 NOTE — PDOC.BPN
- Brief Progress Note


Encounter Date: 12/14/20


Encounter Time: 13:00


Called pt's wife Wendy and spoke to her about pt's baseline. They have been 

 since March of this year.





Mobility: able to ambulate without issue. "A few months ago" he became unsteady,

especially in his L leg. Since then, his speed and mobility has been decreasing.

He has had multiple falls and hit his head at least once but refused to go to 

the ED.





Mentation: he was a  and is normally "very sharp", so he should be

A&O x4. Over the past few months, he has become distant and less conversive. He 

is also normally very concerned about his appearance and personal hygiene but 

since his last discharge from the hospital she has "had to shame him into 

showering".





Continence: he is normally continent of urine and stool. He never had issues 

making it to the bathroom in time, until he started being unsteady and slower to

walk. He had never had an episode of incontinence like the one for which she 

called EMS.





Other: He typically sits on his porch in the evening smoking and drinking 

whiskey. He goes through one bottle of whiskey per week. 3 days ago, when this 

current episode started, is the last time he drank alcohol because he would not 

get out of his chair to go outside to the porch.

## 2020-12-16 NOTE — PRG
DATE OF SERVICE:  12/16/2020



Mr. Santoro continues to be stubborn and recalcitrant regarding our recommendation

for an MRI.  He will, therefore, be discharged to home with careful instructions to

follow up with his PCP and rheumatologist as he is on potent medications for his RA. 







Job ID:  [id number]

## 2020-12-16 NOTE — PDOC.FM
- Subjective


Subjective: 


Mr. Santoro is doing well this morning. He is having significant joint pain which

he says is d/t "both kinds of arthritis" which he has been told he has. He 

denies going back and forth on approving/refusing the MRI yesterday and is 

adamant today that he wants the MRI done. 








- Objective


Vital Signs & Weight: 


                             Vital Signs (12 hours)











  Temp Pulse Resp BP Pulse Ox


 


 12/16/20 04:00  100.0 F H  91  20  150/87 H  96


 


 12/15/20 23:49  99.8 F H  96   166/101 H 


 


 12/15/20 20:40  98.9 F  95  20  153/89 H  99








                                     Weight











Weight                         88.496 kg














I&O: 


                                        











 12/14/20 12/15/20 12/16/20





 06:59 06:59 06:59


 


Output Total  500 


 


Balance  -500 











Result Diagrams: 


                                 12/15/20 10:04





                                 12/15/20 06:24





Phys Exam





- Physical Examination


Constitutional: NAD


Neck: supple


Respiratory: clear to auscultation bilateral


Cardiovascular: RRR, no significant murmur


Neurological: moves all 4 limbs


Psychiatric: normal affect


Skin: no rash





Dx/Plan





- Plan


Plan: 


This is a 71yo M who was brought to the ED after his wife called EMS when he had

not gotten out of a chair for 3 days and then had an episode of incontinence.





SIRS


- Lactate 2.8 -> 2.1


- UA with 4-6 WBC, no bacteria. 


* Recently treated for UTI with rocephin, bactrim; unclear if patient finished 

  course of bactrim. 


- CXR without infiltrate


- urine cx and blood cx neg. Will d/c Rocephin


- procalcitonin 0.15, low risk for sepsis





Urinary incontinence


DDx: UTI, urinary retention with overflow incontinence, difficulty ambulating to

bathroom. 


- Prostate non-tender on examination. 


- bladder scan with PVR volume. Page residents to determine need for straight 

cath/kearney. Will likely d/c this today as he has been voiding well.


- UCx neg


- Starting Flomax today at 0.4mg


- Urine very dark despite LR @ 120mL/h. Pt encouraged to drink fluids





Altered mentation


Ddx: dementia, delirium, depression


- A&O x4 at baseline


* Baseline mentation as described by wife is described in a brief progress note 

  from 12/14


- UDS neg. RPR, HIV, Hep C non-reactive. B12 377, RBC folate 743, TSH 0.768


- CRP 26.85. 


- Pt has repeatedly refused MRI. Will cancel


- SLP consulted for mental status exam


- Consider PHQ-9 to r/o depression as a possible cause





Physical deconditioning


- POA


- PT/OT eval and treat


* PT recommends rehab but pt has [employer] so placement at rehab is unlikely. Will 

  try for SNF


- Baseline mobility as described by wife is described in a brief progress note 

from 12/14


- Possible contributing factor to urinary incontinence


- Wife unable to care for him, details in brief progress note from 12/16





Rheumatoid arthritis and possible OA


- Severe joint pain and swelling


- Per wife, pt has a dx of RA


* ROSHNI and RA screen ordered to confirm 


- Per pt, has dx of both RA & OA


- Continue home Prednisone


- Per wife, has had multiple falls as a result of the pain and instability in 

this leg. Pt denies this.


- LLE knee and hip XR nml





Thrombocytosis


- Plts 489 -> 427 -> 381


- Peripheral smear pending





COPD


Suspect this is the cause of intermittent cough


- tachypnea may be due to chronically poor control of COPD. 


- influenza neg, COVID neg


- start dulera and scheduled duonebs





BPH, suspected


History of BPH documented in previous admission. Does not appear to have any 

home medications for this.


- Flomax 0.4mg





Direct Hyperbilirubinemia 


Suspect Gilbert's disease


- AST/ALT/Alk Phos nml


- TBili 2.0, direct bili 1.0





HTN


Mildly elevated on admission. 


- continue home losartan


- add HCTZ


- continue to monitor vitals





Osteoarthritis


Generalized weakness


Unable to move himself in the bed. Cites pain particularly in bilateral hips 

where he is known to have osteoarthritis.


- PT/OT consulted


- Will likely need placement on discharge, CM/Post acute screen requested.





KEMAR, resolved


- LR @ 120 cc/hr


- monitor with am labs








Dispo: inpatient medical, expected LOS > 2 midnights.


DVT ppx: lovenox


Diet: HH


IVF:  cc/hr


Code: Full

## 2020-12-16 NOTE — PDOC.BPN
- Brief Progress Note


Encounter Date: 12/16/20


Encounter Time: 12:15


I spoke to Mr. Santoro' wife about d/c MRI order d/t pt repeatedly refusing them.

I discussed that at this point we are not doing anything for him inpt and that 

this dementia vs delirium vs depression needs to be answered in the outpt 

setting. She stated she cannot take care of him at home because she has Multiple

Myeloma and is undergoing chemo + has a 45yo disabled daughter that she takes 

care of, so she is not in a position to take care of him while he gets back to 

his baseline. She is resistant to finding him permanent placement but agrees 

that he needs some kind of help. PT has recommended rehab which she is agreeable

to. She is open to considering a switch from rehab to long-term placement if 

things do not improve.





I spoke to Ginny, his CM, and she stated that d/t having [employer] he is 

unlikely to be approved for rehab and he might have better luck with SNF. His 

wife requested a referral be made to rehab anyway because she wants a refusal 

prior to trying something else. Ginny says he cannot be applied for both 

because of insurance.

## 2020-12-17 NOTE — PDOC.FM
- Subjective


Subjective: 


Mr. Santoro is doing well this morning and is quite pleasant. He does know where 

he is this morning. He refused multiple labs, medications, and therapies 

yesterday. He denies joint pain currently.








- Objective


Vital Signs & Weight: 


                             Vital Signs (12 hours)











  Temp Resp


 


 12/17/20 01:01  100.4 F H  22 H


 


 12/16/20 21:46  101 F H  21 H








                                     Weight











Weight                         88.677 kg














I&O: 


                                        











 12/15/20 12/16/20 12/17/20





 06:59 06:59 06:59


 


Intake Total   2767


 


Output Total 500  1125


 


Balance -500  1642











Result Diagrams: 


                                 12/17/20 09:25





                                 12/17/20 09:25





Phys Exam





- Physical Examination


Constitutional: NAD


Neck: supple


Respiratory: clear to auscultation bilateral


Cardiovascular: RRR, no significant murmur


Musculoskeletal: no edema, pulses present


Neurological: non-focal, moves all 4 limbs


Psychiatric: normal affect


Skin: no rash





Dx/Plan





- Plan


Plan: 


This is a 71yo M who was brought to the ED after his wife called EMS when he had

not gotten out of a chair for 3 days and then had an episode of incontinence.





Urinary incontinence


DDx: UTI, urinary retention with overflow incontinence, difficulty ambulating to

bathroom. 


- Prostate non-tender on examination. 


- Pt voiding well, lowers concern for retention with overflow


- UCx neg


- Starting Flomax today at 0.4mg. Pt sometimes refuses


- LR @ 120mL/h. Pt encouraged to drink fluids





Altered mentation


Ddx: dementia, delirium, depression


- Wife's main concern. A&O x4 at baseline


* Baseline mentation as described by wife is described in a brief progress note 

  from 12/14


- UDS neg. RPR, HIV, Hep C non-reactive. 


- B12 377, RBC folate 743, TSH 0.768


- CRP 26.85. 


- Pt has repeatedly refused MRI. Will cancel


- SLP consulted for mental status exam. 


* MOChA score of 12/22 indicative of mild-mod cognitive deficit. It's possible 

  he has been compensating for deficits for a while


- Consider PHQ-9 to r/o depression as a possible cause


- Mentation and cooperativeness fluctuates throughout day, concerning for 

delirium





Physical deconditioning


- POA


- PT/OT eval and treat


* PT recommends rehab but pt has [employer] so placement at rehab is unlikely. Will 

  try for SNF


* Pt sometimes refuses tx


- Baseline mobility as described by wife is described in a brief progress note 

from 12/14


- Possible contributing factor to urinary incontinence


- Wife unable to care for him at home, details in brief progress note from 12/16





Reported Rheumatoid arthritis and OA


- Severe joint pain and swelling


- Per pt, has dx of both RA & OA. Wife corroborates RA dx


* ROSHNI and RA screen neg


- Continue home Prednisone


- Per wife, has had multiple falls as a result of the pain and instability in 

this leg. Pt denies this.


- LLE knee and hip XR nml





Thrombocytosis


- Plts 489 -> 427 -> 381


- Peripheral smear unconcerning





HTN


Mildly elevated on admission. Occur mostly at night


- continue home losartan


- add HCTZ BID


- continue to monitor vitals





COPD


Suspect this is the cause of intermittent cough


- tachypnea may be due to chronically poor control of COPD. 


- influenza neg, COVID neg


- start dulera and scheduled duonebs. Pt sometimes refuses tx





BPH, suspected


History of BPH documented in previous admission. Does not appear to have any 

home medications for this.


- Flomax 0.4mg. Pt sometimes refuses tx





Direct Hyperbilirubinemia 


Suspect Gilbert's disease


- AST/ALT/Alk Phos nml


- TBili 2.0, direct bili 1.0


- No intervention required at this time





Osteoarthritis


Generalized weakness


Unable to move himself in the bed. Cites pain particularly in bilateral LE 

joints


- PT/OT consulted


- Will likely need placement on discharge, CM/Post acute screen requested.





SIRS, resolved


- Lactate 2.8 -> 2.1


- UA with 4-6 WBC, no bacteria. 


- CXR without infiltrate


- urine cx and blood cx neg. Will d/c Rocephin


- procalcitonin 0.15, low risk for sepsis





KEMAR, resolved


- LR @ 120 cc/hr


- monitor with am labs








Dispo: inpatient medical, discharge pending placement since he is refusing 

further investigation. Expected LOS > 2 midnights.


DVT ppx: lovenox


Diet: HH


IVF:  cc/hr


Code: Full

## 2020-12-17 NOTE — PRG
DATE OF SERVICE:  12/17/2020



Mr. Santoro is unchanged clinically.  He is still refusing multiple lab draws and an

MRI. 







Job ID:  [id number]

## 2020-12-18 NOTE — MRI
Brain MRI with and without contrast:

12/18/2020



COMPARISON: None



HISTORY: Altered mental status, generalized weakness



TECHNIQUE: Multiplanar multisequence MR imaging of the brain obtained with and without contrast



FINDINGS: The diffusion weighted imaging demonstrates no evidence for acute infarction.



Axial gradient echo imaging demonstrates no evidence for intracranial hemorrhage.



The imaged paranasal sinuses and mastoid air cells demonstrate no acute findings.



Arterial flow voids at the axial level of the skull base appear unremarkable on the T2-weighted imagi
ng.



Multiple subcentimeter foci of increased T2 and FLAIR signal noted within the white matter suggesting
 a degree of small vessel disease. There is diffuse cerebral volume loss with associated prominence

of the CSF containing spaces.



The postcontrast imaging demonstrates no abnormal enhancement within the brain parenchyma.



IMPRESSION: No evidence for acute infarction or intracranial hemorrhage. Mild small vessel disease.



Reported By: Ata Up 

Electronically Signed:  12/18/2020 1:03 PM

## 2020-12-18 NOTE — PDOC.FM
- Subjective


Subjective: 


Mr. Santoro is irritable this morning but states he is feeling well and had a 

good night. He states he does not remember agreeing to a brain MRI this am but 

does not sound resistant to the idea currently. He remembers his brother came to

visit him yesterday. His brother is planning on coming to visit again this 

morning and the plan is to time the MRI around that time since having him around

seemed to make Mr. Santoro more consistently cooperative. The overnight nurse 

stated he was oriented to date and location but that he continues to have 

tangential, unfocused speech.








- Objective


Vital Signs & Weight: 


                             Vital Signs (12 hours)











  Temp Pulse Resp BP Pulse Ox


 


 12/18/20 04:00  97.2 F L  89  18  134/66  94 L


 


 12/17/20 20:00      98


 


 12/17/20 19:55  98 F  100  18  147/65 H  98








                                     Weight











Weight                         88.451 kg














I&O: 


                                        











 12/16/20 12/17/20 12/18/20





 06:59 06:59 06:59


 


Intake Total  4764 1740


 


Output Total  1125 965


 


Balance  3637 775











Result Diagrams: 


                                 12/17/20 09:25





                                 12/17/20 09:25





Phys Exam





- Physical Examination


Constitutional: NAD


Neck: supple, full ROM


Neurological: non-focal, moves all 4 limbs


Psychiatric: normal affect


Skin: no rash





Dx/Plan





- Plan


Plan: 


This is a 69yo M who was brought to the ED after his wife called EMS when he had

not gotten out of a chair for 3 days and then had an episode of incontinence.





Urinary incontinence


DDx: UTI, urinary retention with overflow incontinence, difficulty ambulating to

bathroom. 


- Prostate non-tender on examination. 


* Starting Flomax today at 0.4mg. Pt sometimes refuses


- Pt voiding well, lowers concern for retention with overflow


- UCx neg


- LR @ 120mL/h. Pt encouraged to drink fluids





Altered mentation


Ddx: dementia, delirium, depression, NPH


- Wife's main concern. A&O x4 at baseline


* Baseline mentation as described by wife is described in a brief progress note 

  from 12/14


- MOChA score of 12/22 indicative of mild-mod cognitive deficit


- Consider PHQ-9 to r/o depression as a possible cause


- Mentation and cooperativeness fluctuates throughout day, concerning for 

delirium


- Has become less steady over several months + change in personality/behavior + 

incontinence, concerning for NPH


* Pt has repeatedly refused brain MRI but family has requested repeat attempt





Physical deconditioning


- POA


- PT/OT eval and treat


* PT recommends rehab but pt has [employer] so placement at rehab is unlikely. Will 

  try for SNF


* Pt sometimes refuses tx


- Baseline mobility as described by wife is described in a brief progress note 

from 12/14


- Possible contributing factor to urinary incontinence


- Wife unable to care for him at home, details in brief progress note from 12/16





Reported Rheumatoid arthritis and OA


- Severe joint pain and swelling


- Per pt, has dx of both RA & OA. Wife corroborates RA dx


* ROSHNI and RA screen neg


- Continue home Prednisone


- Tylenol


- LLE knee and hip XR nml


- Per wife, has had multiple falls as a result of the pain and instability in 

this leg. Pt denies this.





Thrombocytosis


- Plts 489 -> 427 -> 381


- Peripheral smear unconcerning





HTN


- continue home losartan


- add HCTZ BID


- continue to monitor vitals





COPD


Suspect this is the cause of intermittent cough


- tachypnea may be due to chronically poor control of COPD. 


- influenza neg, COVID neg


- start dulera and scheduled duonebs. Pt sometimes refuses tx





BPH, suspected


History of BPH documented in previous admission. Does not appear to have any 

home medications for this.


- Flomax 0.4mg. Pt sometimes refuses tx





Direct Hyperbilirubinemia 


Suspect Gilbert's disease


- AST/ALT/Alk Phos nml


- TBili 2.0, direct bili 1.0


- No intervention required at this time





Osteoarthritis


Generalized weakness


Unable to move himself in the bed. Cites pain particularly in bilateral LE 

joints


- PT/OT consulted


- Will likely need placement on discharge, CM/Post acute screen requested.





KEMAR, resolved


- LR @ 120 cc/hr


- monitor with am labs





SIRS, resolved








Dispo: inpatient medical, discharge pending placement since he is refusing 

further investigation. Expected LOS > 2 midnights.


DVT ppx: lovenox


Diet: HH


IVF:  cc/hr


Code: Full

## 2020-12-18 NOTE — PRG
DATE OF SERVICE:  12/18/2020



I have read the note of Dr. Galvan and discussed the case with her.  I agree

with her assessment and plan. 







Job ID:  [id number]

## 2020-12-19 NOTE — PDOC.FM
- Subjective


Subjective: 


Patient is resting in bed. When asked about pain he says "everywhere" and when I

asked more specifics he said "everything hurts when you get old". Denies chest 

pain, shortness of breath, n/v. Did not want to answer other questions for me. 








- Objective


MAR Reviewed: Yes


Vital Signs & Weight: 


                             Vital Signs (12 hours)











  Temp Pulse Resp BP Pulse Ox


 


 12/19/20 04:00  98.8 F  94  18  117/66  99


 


 12/18/20 19:35  98.4 F  110 H  17  100/71  94 L








                                     Weight











Weight                         88.451 kg














I&O: 


                                        











 12/17/20 12/18/20 12/19/20





 06:59 06:59 06:59


 


Intake Total 4764 1740 1200


 


Output Total 1125 965 


 


Balance 3639 775 1200











Result Diagrams: 


                                 12/17/20 09:25





                                 12/17/20 09:25





Phys Exam





- Physical Examination


Constitutional: NAD


Respiratory: no wheezing, clear to auscultation bilateral


Cardiovascular: RRR, no significant murmur


Musculoskeletal: no edema





Dx/Plan





- Plan


Plan: 


This is a 69yo M who was brought to the ED after his wife called EMS when he had

not gotten out of a chair for 3 days and then had an episode of incontinence.





Urinary incontinence


DDx: UTI, urinary retention with overflow incontinence, difficulty ambulating to

bathroom. 


- Prostate non-tender on examination. 


* Flomax started at 0.4mg. Pt sometimes refuses


- Pt voiding well, lowers concern for retention with overflow


- UCx neg


- Pt encouraged to drink fluids





Altered mentation


Ddx: dementia, delirium, depression, NPH


- Wife's main concern. A&O x4 at baseline


* Baseline mentation as described by wife is described in a brief progress note 

  from 12/14


- MOChA score of 12/22 indicative of mild-mod cognitive deficit


- Consider PHQ-9 to r/o depression as a possible cause


- Mentation and cooperativeness fluctuates throughout day, concerning for 

delirium


- Has become less steady over several months + change in personality/behavior + 

incontinence, concerning for NPH


* Brain MRI: no evidence of acute infarct or hemorrhage, mild small vessel 

  disease





Physical deconditioning


- POA


- PT/OT eval and treat


* PT recommends rehab but pt has [employer] so placement at rehab is unlikely. Will 

  try for SNF


* Pt sometimes refuses tx


- Baseline mobility as described by wife is described in a brief progress note 

from 12/14


- Possible contributing factor to urinary incontinence


- Wife unable to care for him at home, details in brief progress note from 12/16





Reported Rheumatoid arthritis and OA


- Severe joint pain and swelling


- Per pt, has dx of both RA & OA. Wife corroborates RA dx


* ROSHNI and RA screen neg


- Continue home Prednisone


- Tylenol


- LLE knee and hip XR nml


- Per wife, has had multiple falls as a result of the pain and instability in 

this leg. Pt denies this.





Thrombocytosis


- Plts 489 -> 427 -> 381


- Peripheral smear unconcerning





HTN


- continue home losartan


- add HCTZ BID


- continue to monitor vitals





COPD


Suspect this is the cause of intermittent cough


- tachypnea may be due to chronically poor control of COPD. 


- influenza neg, COVID neg


- start dulera and scheduled duonebs. Pt sometimes refuses tx





BPH, suspected


History of BPH documented in previous admission. Does not appear to have any 

home medications for this.


- Flomax 0.4mg. Pt sometimes refuses tx





Direct Hyperbilirubinemia 


Suspect Gilbert's disease


- AST/ALT/Alk Phos nml


- TBili 2.0, direct bili 1.0


- No intervention required at this time





Osteoarthritis


Generalized weakness


Unable to move himself in the bed. Cites pain particularly in bilateral LE 

joints


- PT/OT consulted


- Will likely need placement on discharge, CM/Post acute screen requested.





KEMAR, resolved


- monitor with am labs





SIRS, resolved








Dispo: discharge pending placement. Expected LOS > 2 midnights.


DVT ppx: lovenox


Diet: HH


IVF: SL


Code: Full








Addendum - Attending





- Attending Attestation


Date/Time: 12/19/20 2031





I personally evaluated the patient and discussed the management with Dr. Harper


I agree with the History, Examination, Assessment and Plan documented above with

any addition or exceptions noted below.


Deconditioning- needs Rehab vs SNF for safety.  Awaiting placement.

## 2020-12-19 NOTE — EKG
Test Reason : SEPSIS

Blood Pressure : ***/*** mmHG

Vent. Rate : 114 BPM     Atrial Rate : 114 BPM

   P-R Int : 124 ms          QRS Dur : 078 ms

    QT Int : 320 ms       P-R-T Axes : 042 046 050 degrees

   QTc Int : 441 ms

 

Sinus tachycardia

Otherwise normal ECG

 

Confirmed by DEMETRA RODRIGUEZ, MARTY WOOD ([id number]),  CHE MONSIVAIS ([id number]) on 12/19/2020 10:49:03 AM

 

Referred By:  DEMETRA           Confirmed By:MARTY MCCRARY MD

## 2020-12-20 NOTE — PDOC.FM
- Subjective


Subjective: 


Patient is sitting up in bed. He states that he doesn't feel well, but can't say

what doesn't feel well. He denies chest pain, shortness of breath, abdominal 

pain, dysuria, fever/chills. When asked orientation questions he would not 

answer, states that those questions make him feel like we don't think he knows 

where he is. 








- Objective


MAR Reviewed: Yes


Vital Signs & Weight: 


                             Vital Signs (12 hours)











  Temp Pulse Resp BP Pulse Ox


 


 12/20/20 00:52      98


 


 12/19/20 23:55     132/69 


 


 12/19/20 19:50  97.6 F  110 H  16  99/73  98








                                     Weight











Weight                         86.183 kg














I&O: 


                                        











 12/18/20 12/19/20 12/20/20





 06:59 06:59 06:59


 


Intake Total 1740 1275 360


 


Output Total 965 250 


 


Balance 775 1025 360











Result Diagrams: 


                                 12/20/20 07:22





                                 12/20/20 07:22





Phys Exam





- Physical Examination


Constitutional: NAD


HEENT: PERRLA, moist MMs


Respiratory: no wheezing, clear to auscultation bilateral


tachycardic


Gastrointestinal: soft, non-tender, positive bowel sounds


Musculoskeletal: no edema


Neurological: non-focal, moves all 4 limbs


Skin: no rash





Dx/Plan





- Plan


Plan: 


This is a 69yo M who was brought to the ED after his wife called EMS when he had

not gotten out of a chair for 3 days and then had an episode of incontinence.





Urinary incontinence


DDx: suspected 2/2 difficulty ambulating to bathroom, UTI & urinary retention 

with overflow incontinence unlikely


- Prostate non-tender on examination. 


* Flomax started at 0.4mg. Pt sometimes refuses


- Pt voiding well, lowers concern for retention with overflow


- UCx neg


- Pt encouraged to drink fluids





Altered mentation


Ddx: dementia, delirium, depression, NPH


- Wife's main concern. A&O x4 at baseline


* Baseline mentation as described by wife is described in a brief progress note 

  from 12/14


- MOChA score of 12/22 indicative of mild-mod cognitive deficit


- Consider PHQ-9 to r/o depression as a possible cause


- Mentation and cooperativeness fluctuates throughout day, concerning for 

delirium


- Has become less steady over several months + change in personality/behavior + 

incontinence, concerning for NPH


* Brain MRI: no evidence of acute infarct or hemorrhage, mild small vessel 

  disease





Tachycardia


Likely 2/2 dehydration, patient states he does not want to drink fluids


Unlikely infection - patient is afebrile, denies chest pain/SOB, 

urinary/abdominal complaints


- ordered am labs including CBC/CMP


- ordered EKG





KEMAR, supected 2/2 dehydration, reduced PO intake


0.8 > 1.14 today


reduced PO intake 600ml


- will start mIVFs


- continue to encourage PO intake





Physical deconditioning


- POA


- PT/OT eval and treat


* PT recommends rehab but pt has [employer] so placement at rehab is unlikely. Will 

  try for SNF


* Pt sometimes refuses tx


- Baseline mobility as described by wife is described in a brief progress note 

from 12/14


- Possible contributing factor to urinary incontinence


- Wife unable to care for him at home, details in brief progress note from 12/16





Reported Rheumatoid arthritis and OA


- Severe joint pain and swelling


- Per pt, has dx of both RA & OA. Wife corroborates RA dx


* ROSHNI and RA screen neg


- Continue home Prednisone


- Tylenol


- LLE knee and hip XR nml


- Per wife, has had multiple falls as a result of the pain and instability in 

this leg. Pt denies this.





Thrombocytosis


- Plts 489 -> 427 -> 381


- Peripheral smear unconcerning





HTN


- continue home losartan


- add HCTZ BID


- continue to monitor vitals





COPD


Suspect this is the cause of intermittent cough


- tachypnea may be due to chronically poor control of COPD. 


- influenza neg, COVID neg


- start dulera and scheduled duonebs. Pt sometimes refuses tx





BPH, suspected


History of BPH documented in previous admission. Does not appear to have any 

home medications for this.


- Flomax 0.4mg. Pt sometimes refuses tx





Direct Hyperbilirubinemia 


Suspect Gilbert's disease


- AST/ALT/Alk Phos nml


- TBili 2.0, direct bili 1.0


- No intervention required at this time





Osteoarthritis


Generalized weakness


Unable to move himself in the bed. Cites pain particularly in bilateral LE 

joints


- PT/OT consulted


- Will likely need placement on discharge, CM following, rehab vs SNF





SIRS, resolved








Dispo: discharge pending placement. Expected LOS > 2 midnights.


DVT ppx: lovenox


Diet: HH


IVF: SL


Code: Full








Addendum - Attending





- Attending Attestation


Date/Time: 12/20/20 2632





I personally evaluated the patient and discussed the management with Dr. Harper.


I agree with the History, Examination, Assessment and Plan documented above with

any addition or exceptions noted below.


AMS presumed due to dementia as workup otherwise unremarkable.  


Tachycardia- sinus- will start IVF and patient has not been drinking adequate 

fluids


KEMAR- fluids as ablove.


Awaiting placement for SNF or rehab.

## 2020-12-21 NOTE — PDOC.FM
- Subjective


Subjective: 


Mr. Santoro was sleeping this morning with the covers over his head and would not

respond to me when I tried talking to him.








- Objective


Vital Signs & Weight: 


                             Vital Signs (12 hours)











  Temp Pulse Resp BP Pulse Ox


 


 12/21/20 02:58  98.5 F  99  18  134/83  92 L


 


 12/21/20 00:16      98


 


 12/20/20 20:00      98


 


 12/20/20 19:28  97.9 F  80  18  135/80  96








                                     Weight











Weight                         86.636 kg














I&O: 


                                        











 12/19/20 12/20/20 12/21/20





 06:59 06:59 06:59


 


Intake Total 1275 600 505


 


Output Total 250  


 


Balance 1025 600 505











Result Diagrams: 


                                 12/20/20 07:22





                                 12/21/20 06:02





Phys Exam





- Physical Examination


Constitutional: NAD (Rest of PE not performed d/t pt not cooperating)





Dx/Plan





- Plan


Plan: 


This is a 69yo M who was brought to the ED after his wife called EMS when he had

not gotten out of a chair for 3 days and then had an episode of incontinence.





Urinary incontinence


DDx: suspected 2/2 difficulty ambulating to bathroom, UTI & urinary retention 

with overflow incontinence unlikely


- Prostate non-tender on examination. 


* Flomax started at 0.4mg. Pt sometimes refuses


- Pt voiding well, lowers concern for retention with overflow


- UCx neg


- Pt encouraged to drink fluids but not drinking well and requiring IVF





Altered mentation


Ddx: dementia, delirium, depression, NPH


- Wife's main concern. A&O x4 at baseline


* Baseline mentation as described by wife is described in a brief progress note 

  from 12/14


- MOChA score of 12/22 indicative of mild-mod cognitive deficit


- Mentation and cooperativeness fluctuates throughout day, concerning for 

delirium


- Depression possible. Will attempt PHQ-9 to r/o


- NPH r/o based on MRI





Tachycardia


Likely 2/2 dehydration, patient states he does not want to drink fluids


- Unlikely infection given neg BCx x4 and UCx 


- ordered am labs including CBC/CMP


- ordered EKG





KEMAR, supected 2/2 dehydration, reduced PO intake, resolved


- Cr back to baseline today after re-initiation of mIVFs yesterday


- continue to encourage PO intake


- Continue to monitor with am BMP





Physical deconditioning


- POA


- PT/OT eval and treat


* PT recommends SNF v. rehab 


- Baseline mobility as described by wife is described in a brief progress note 

from 12/14


- Possible contributing factor to urinary incontinence


- Wife unable to care for him at home, details in brief progress note from 12/16





Reported Rheumatoid arthritis and OA


- Severe joint pain and swelling


- Per pt, has dx of both RA & OA. Wife corroborates RA dx


* ROSHNI and RA screen neg


- Continue home Prednisone. Tylenol for OA.





Thrombocytosis


- Plts 489 -> 427 -> 381 -> 445


- Peripheral smear unconcerning





HTN


- continue home losartan


- add HCTZ BID


- continue to monitor vitals





COPD


Suspect this is the cause of intermittent cough


- tachypnea may be due to chronically poor control of COPD. 


- dulera and scheduled duonebs





BPH, suspected


History of BPH documented in previous admission. Does not appear to have any 

home medications for this.


- Flomax 0.4mg. Pt sometimes refuses tx





Transaminitis


- AST/ALT nml on arrival


- AST 44, ALT 60 on 12/20. Alk Phos continues to be nml


- Monitor with am CMPs





Direct Hyperbilirubinemia 


Suspect Gilbert's disease


- No intervention required at this time





Osteoarthritis


Generalized weakness


Unable to move himself in the bed. Cites pain particularly in bilateral LE 

joints


- PT/OT consulted


- Will likely need placement on discharge, CM following, rehab vs SNF





SIRS, resolved








Dispo: discharge pending placement. Expected LOS > 2 midnights.


DVT ppx: lovenox


Diet: HH


IVF: SL


Code: Full

## 2020-12-21 NOTE — EKG
Test Reason : 

Blood Pressure : ***/*** mmHG

Vent. Rate : 102 BPM     Atrial Rate : 102 BPM

   P-R Int : 126 ms          QRS Dur : 082 ms

    QT Int : 340 ms       P-R-T Axes : 050 044 042 degrees

   QTc Int : 443 ms

 

Sinus tachycardia with Premature supraventricular complexes

Otherwise normal ECG

When compared with ECG of 13-DEC-2020 19:28,

Premature supraventricular complexes are now Present

Confirmed by AGUSTIN RODRIGUEZ, DR. WEIR ([id number]) on 12/21/2020 6:44:55 PM

 

Referred By:  WILLIAM           Confirmed By:DR. CARMELLA VELEZ MD

## 2020-12-22 NOTE — PDOC.FM
- Subjective


Subjective: 


Mr. Santoro was not willing to pull the sheets down from over his head to talk 

with me this morning.








- Objective


Vital Signs & Weight: 


                             Vital Signs (12 hours)











  Temp Pulse Resp BP BP Pulse Ox


 


 12/22/20 04:00  98.7 F  79  20   150/92 H  94 L


 


 12/21/20 20:00  97.9 F  83  20  124/81   96








                                     Weight











Weight                         86.85 kg














I&O: 


                                        











 12/20/20 12/21/20 12/22/20





 06:59 06:59 06:59


 


Intake Total 600 1945 240


 


Output Total  500 


 


Balance 600 1445 240











Result Diagrams: 


                                 12/20/20 07:22





                                 12/21/20 06:02





Phys Exam





- Physical Examination


Constitutional: NAD (Rest of physical exam not performed d/t pt not cooperating)





Dx/Plan





- Plan


Plan: 


This is a 71yo M who was brought to the ED after his wife called EMS when he had

not gotten out of a chair for 3 days and then had an episode of incontinence.





Urinary incontinence


- Suspect 2/2 difficulty ambulating to bathroom


- Prostate non-tender on examination. See below


- Pt encouraged to drink fluids but not drinking well and requiring IVF





BPH, suspected


History of BPH documented in previous admission. Does not appear to have any 

home medications for this.


- Flomax 0.4mg





Physical deconditioning


- POA


- PT/OT eval and treat


* PT recommends SNF v. rehab 


- Baseline mobility as described by wife is in a brief progress note from 12/14


- Possible contributing factor to urinary incontinence


- Wife unable to care for him at home, details in brief progress note from 12/16


- CM helping obtain placement. Insurance rejected rehab; will try for SNF.





Altered mentation


- Suspect mild dementia and delirium


- A&O x4 at baseline. Baseline as described by wife is in a brief progress note 

from 12/14


- MOChA score of 12/22 indicative of mild-mod cognitive deficit


- Mentation and cooperativeness fluctuates throughout day, concerning for 

delirium


- NPH r/o based on MRI





Tachycardia


Likely 2/2 dehydration, patient states he does not want to drink fluids


- Unlikely infxn given neg cx, nml WBC count, nml vitals





KEMAR, supected 2/2 dehydration, reduced PO intake, resolved


- Cr back to baseline today after re-initiation of mIVFs yesterday


- continue to encourage PO intake


- Continue to monitor with am BMP, pt allowing





Reported Rheumatoid arthritis and OA


- Severe joint pain and swelling


- Per pt, has dx of both RA & OA. Wife corroborates RA dx


* ROSHNI and RA screen neg


- D/c Prednisone today. Recommend OP f/u with Rheumatologist


- Tylenol for OA.





Thrombocytosis


- Peripheral smear unconcerning





HTN


- continue home losartan


- add HCTZ BID


- continue to monitor vitals





COPD


- dulera and scheduled duonebs





Transaminitis


- AST/ALT nml on arrival


- AST 44, ALT 60 on 12/20. Alk Phos continues to be nml


- Monitor with am CMPs, pt allowing





Direct Hyperbilirubinemia 


Suspect Gilbert's disease


- No intervention required at this time





Osteoarthritis


Generalized weakness


Unable to move himself in the bed. Cites pain particularly in bilateral LE 

joints


- PT/OT consulted


- CM consulted, as stated above





SIRS, resolved








Dispo: discharge pending placement. 


DVT ppx: lovenox


Diet: HH


IVF: SL


Code: Full

## 2020-12-23 NOTE — PDOC.FM
- Subjective


Subjective: 


Mr. Santoro is grumpy this morning. He is wearing street clothes on top of his 

hospital gown and is eager to go home. He is adamant that he is not going to go 

to SNF and that he is able to stand/walk by himself, so he is safe to go home. 

He was unwilling to answer any other questions








- Objective


Vital Signs & Weight: 


                             Vital Signs (12 hours)











  Temp Pulse Resp BP Pulse Ox


 


 12/23/20 05:50   102 H  16  144/77 H  94 L


 


 12/22/20 20:00  98.2 F  100  16  112/73  95








                                     Weight











Weight                         87.634 kg














I&O: 


                                        











 12/21/20 12/22/20 12/23/20





 06:59 06:59 06:59


 


Intake Total 0038 477 6049


 


Output Total 500  700


 


Balance 9856 345 4125











Result Diagrams: 


                                 12/20/20 07:22





                                 12/22/20 08:09





Phys Exam





- Physical Examination


Constitutional: NAD


Neck: supple


Respiratory: clear to auscultation bilateral


Neurological: non-focal, moves all 4 limbs





Dx/Plan





- Plan


Plan: 


This is a 69yo M who was brought to the ED after his wife called EMS when he had

not gotten out of a chair for 3 days and then had an episode of incontinence.





Urinary incontinence


- Suspect 2/2 difficulty ambulating to bathroom


- Prostate non-tender on examination. See below


- Pt encouraged to drink fluids but not drinking well and requiring IVF for 

renal function


* IV lost and pt repeatedly refusing replacement. Will d/c order.





BPH, suspected


History of BPH documented in previous admission. Does not appear to have any 

home medications for this.


- Flomax 0.4mg





Physical deconditioning


- POA. Possible contributing factor to urinary incontinence


- PT/OT eval and treat


* PT recommends SNF v. rehab 


* Pt has continually refused OT. Will d/c order


- Baseline mobility as described by wife is in a brief progress note from 12/14


- Wife unable to care for him at home, details in brief progress note from 12/16


- CM helping obtain placement. Insurance rejected rehab; will try for SNF.





Altered mentation


- Suspect mild dementia and delirium


- A&O x4 at baseline. Baseline as described by wife is in a brief progress note 

from 12/14


- MOChA score of 12/22 indicative of mild-mod cognitive deficit


- Mentation and cooperativeness fluctuates throughout day, concerning for 

delirium





Tachycardia


Likely 2/2 dehydration, patient states he does not want to drink fluids


- Occurs intermittently





KEMAR, supected 2/2 dehydration, reduced PO intake, resolved


- Cr back to baseline today after re-initiation of mIVFs


- continue to encourage PO intake





Reported Rheumatoid arthritis and OA


- Severe joint pain and swelling


- Per pt, has dx of both RA & OA. Wife corroborates RA dx


* ROSHNI and RA screen neg


- Recommend OP f/u with Rheumatologist


- Tylenol for OA.





Thrombocytosis


- Peripheral smear unconcerning





HTN


- continue home losartan


- add HCTZ BID


- continue to monitor vitals





COPD


- dulera and scheduled duonebs





Direct Hyperbilirubinemia 


Suspect Gilbert's disease


- No intervention required at this time





Osteoarthritis


Generalized weakness


Unable to move himself in the bed. Cites pain particularly in bilateral LE 

joints


- PT/OT consulted


- CM consulted, as stated above





SIRS, resolved


Transaminitis, resolved








Dispo: discharge pending placement. 


DVT ppx: lovenox


Diet: HH


IVF: SL


Code: Full

## 2020-12-24 NOTE — DIS
DATE OF ADMISSION:  12/13/2020



DATE OF DISCHARGE:  12/24/2020



CONSULT:  None.



PROCEDURES:  

1. Chest x-ray, EKG (12/13).

2. Left hip x-ray, left knee x-ray (12/15).

3. Brain MRI (12/17).

4. EKG (12/20) showing sinus tachycardia with premature SVCs.



PRIMARY DIAGNOSES:  Metabolic encephalopathy 2/2 dementia versus delirium, physical

deconditioning. 



SECONDARY DIAGNOSES:  Rheumatoid arthritis, osteoarthritis, acute kidney injury,

chronic obstructive pulmonary disease, BPH, alcohol abuse. 



DISCHARGE MEDICATIONS:  

1. Dulcolax 10 mg p.o. p.r.n.

2. Dulera 2 puffs b.i.d.

3. Tamsulosin 0.4 mg p.o. daily.

4. HCTZ 12.5 mg p.o. b.i.d.

5. Acetaminophen 650 mg p.o. q.4 h. p.r.n.

6. Losartan 50 mg p.o. daily.

7. Aspirin 81 mg p.o. daily. 



Discontinued medications none.



HISTORY OF PRESENT ILLNESS/HOSPITAL COURSE:  This is a 70-year-old patient who was

brought by EMS to the ED after having an episode of urinary incontinence.  Per his

wife, the patient had not been able to get out of his recliner in three days,

although the reason for this was unclear.  Earlier in the month, he had been

admitted for similar symptoms and was found to have sepsis 2/2 UTI as well as COPD

that was chronically uncontrolled.  Throughout his stay, there was a question

regarding the patient's mental status.  Per his wife, he is typically A and O x3 and

very bright, but during his stay, he appeared to fluctuate between in oriented and

confused state.  Frequently, he would refuse to answer questions when asked about

orientation.  Other times, he would answer incorrectly, but when reoriented would be

adamant that he was joking and had answered incorrectly intentionally.  Speech has

performed a MoCA exam on him in which he scored 12/22 points indicating mild to

moderate cognitive deficit.  Despite this, his brother visited and felt his

mentation was at baseline, although his wife did not.  A brain MRI was obtained to

rule out things such as NPH, stroke, and hemorrhage since the wife reported a

history of falls and potential head injuries.  The MRI showed no evidence for

infarct, hemorrhage, or NPH.  The patient was not found to have a UTI and urine and

blood cultures were negative.  The urinary incontinence differential included

urinary retention with overflow incontinence, BPH, and difficulty ambulating to the

bathroom due to physical deconditioning and pain related to both RA and OA.  His

prostate was nontender on exam, but a previous discharge summary mentioned BPH as a

diagnosis, so the patient was started on Flomax.  Initially, bladder scans were

performed to ensure the patient was voiding appropriately, which he was.  As such,

it was determined a likely cause of the incontinence was mechanical in nature.  Left

hip and knee x-rays were obtained, which showed moderate degenerative changes, but

no fracture.  Per the patient and his wife, the patient has a diagnosis of

rheumatoid arthritis, though last time he saw his rheumatologist was "a few years

ago," and he had since retired.  PT/OT were consulted to evaluate and treat the

patient for this.  He refused occupational therapy throughout his entire stay and

intermittently accepted physical therapy.  During their sessions, they would comment

that the patient was not able to stand up on his own, let alone walk, although the

patient was adamant that he could walk by himself and that the written reports were

false.  He had an KEMAR on admission that improved with fluid resuscitation.

Throughout his stay, the patient was encouraged p.o. intake, but he did not do this

well and continually required IV fluids to maintain his renal function.  He has a

history of hypertension and required changes to the medical management of it.

Intermittently and on a daily basis, the patient was found to be mildly tachycardic,

but it would self resolve.  It is unclear whether this is related to the patient's

poorly controlled COPD.  Initially, there were also a few elevated temperature

reading, but without signs or symptoms of infection, these were to determine to be

likely due to his RA.  The patient was given a 5-day course of prednisone during his

stay to attempt to help with this RA flare.  Ultimately, Physical Therapy

recommended rehab versus SNF placement, but his insurance declined both.  As such,

the patient was sent home with home health and therapy services. 



DISPOSITION:  Stable.



DISCHARGE INSTRUCTIONS:  

1. Location, home with home health with PT/OT.

2. Diet, heart healthy.

3. Activity as tolerated.

4. Followup.  Patient is encouraged to follow up with his primary care physician

within 7 to 10 days of discharge. 

5. The patient is encouraged to establish with a new rheumatologist and follow up as

soon as possible. 







Job ID:  [id number]

## 2020-12-24 NOTE — PDOC.FM
- Subjective


Subjective: 


Mr. Santoro was doing well this morning. He wants to go home. He denies CP, 

SOB/dyspnea. He continues to endorse LE pain.








- Objective


Vital Signs & Weight: 


                             Vital Signs (12 hours)











  Temp Pulse Resp BP Pulse Ox


 


 12/23/20 19:34  98.2 F  111 H  20  128/75  93 L








                                     Weight











Weight                         87.634 kg














I&O: 


                                        











 12/22/20 12/23/20 12/24/20





 06:59 06:59 06:59


 


Intake Total 240 3039 1000


 


Output Total  1750 1000


 


Balance 240 1289 0











Result Diagrams: 


                                 12/20/20 07:22





                                 12/22/20 08:09





Phys Exam





- Physical Examination


Constitutional: NAD


Neck: supple


Respiratory: clear to auscultation bilateral


Cardiovascular: RRR, no significant murmur


Neurological: non-focal, moves all 4 limbs


Psychiatric: normal affect





Dx/Plan





- Plan


Plan: 


This is a 69yo M who was brought to the ED after his wife called EMS when he had

not gotten out of a chair for 3 days and then had an episode of incontinence.





Urinary incontinence


- Suspect 2/2 difficulty ambulating to bathroom


- Prostate non-tender on examination. See below





BPH, suspected


History of BPH documented in previous admission. Does not appear to have any 

home medications for this.


- Flomax 0.4mg





Physical deconditioning


- POA. Possible contributing factor to urinary incontinence


- PT eval and treat


* PT recommends SNF v. rehab 


- Baseline mobility as described by wife is in a brief progress note from 12/14


- Wife unable to care for him at home, details in brief progress note from 12/16


-  helping obtain placement. Insurance rejected rehab and SNF; will go home 

with 





Altered mentation


- Suspect mild dementia and delirium


- A&O x4 at baseline. Baseline as described by wife is in a brief progress note 

from 12/14


- MOChA score of 12/22 indicative of mild-mod cognitive deficit


- Mentation and cooperativeness fluctuates throughout day, concerning for 

delirium





Tachycardia


Likely 2/2 dehydration, patient states he does not want to drink fluids


- Occurs intermittently





KEMAR, supected 2/2 dehydration, reduced PO intake, resolved


- Cr back to baseline today after re-initiation of mIVFs


- continue to encourage PO intake





Reported Rheumatoid arthritis and OA


- Severe joint pain and swelling


- Per pt, has dx of both RA & OA. Wife corroborates RA dx


* ROSHNI and RA screen neg


- Recommend OP f/u with Rheumatologist


- Tylenol for OA.





Thrombocytosis


- Peripheral smear unconcerning





HTN


- continue home losartan


- add HCTZ BID


- continue to monitor vitals





COPD


- dulera and scheduled duonebs





Direct Hyperbilirubinemia 


Suspect Gilbert's disease


- No intervention required at this time





Osteoarthritis


Generalized weakness


Unable to move himself in the bed. Cites pain particularly in bilateral LE 

joints


- PT/OT consulted


- CM consulted, as stated above





SIRS, resolved


Transaminitis, resolved








Dispo: discharge home with HH today.


DVT ppx: lovenox


Diet: HH


IVF: SL


Code: Full